# Patient Record
Sex: FEMALE | Race: WHITE | HISPANIC OR LATINO | URBAN - METROPOLITAN AREA
[De-identification: names, ages, dates, MRNs, and addresses within clinical notes are randomized per-mention and may not be internally consistent; named-entity substitution may affect disease eponyms.]

---

## 2020-06-22 ENCOUNTER — OFFICE VISIT (OUTPATIENT)
Dept: URBAN - METROPOLITAN AREA CLINIC 78 | Facility: CLINIC | Age: 60
End: 2020-06-22

## 2020-07-13 ENCOUNTER — OFFICE VISIT (OUTPATIENT)
Dept: URBAN - METROPOLITAN AREA CLINIC 78 | Facility: CLINIC | Age: 60
End: 2020-07-13
Payer: MEDICARE

## 2020-07-13 DIAGNOSIS — Z86.19 H/O HELICOBACTER INFECTION: ICD-10-CM

## 2020-07-13 DIAGNOSIS — K29.70 GASTRITIS: ICD-10-CM

## 2020-07-13 DIAGNOSIS — K58.9 IBS (IRRITABLE BOWEL SYNDROME)-DIARRHEA: ICD-10-CM

## 2020-07-13 DIAGNOSIS — G47.00 INSOMNIA: ICD-10-CM

## 2020-07-13 DIAGNOSIS — R10.13 DYSPEPSIA: ICD-10-CM

## 2020-07-13 DIAGNOSIS — R10.9 ABDOMINAL CRAMPS: ICD-10-CM

## 2020-07-13 DIAGNOSIS — K63.5 COLON POLYPS: ICD-10-CM

## 2020-07-13 PROCEDURE — G9903 PT SCRN TBCO ID AS NON USER: HCPCS | Performed by: INTERNAL MEDICINE

## 2020-07-13 PROCEDURE — 3017F COLORECTAL CA SCREEN DOC REV: CPT | Performed by: INTERNAL MEDICINE

## 2020-07-13 PROCEDURE — 99214 OFFICE O/P EST MOD 30 MIN: CPT | Performed by: INTERNAL MEDICINE

## 2020-07-13 PROCEDURE — G8427 DOCREV CUR MEDS BY ELIG CLIN: HCPCS | Performed by: INTERNAL MEDICINE

## 2020-07-13 PROCEDURE — G8417 CALC BMI ABV UP PARAM F/U: HCPCS | Performed by: INTERNAL MEDICINE

## 2020-07-13 RX ORDER — ANASTROZOLE 1 MG/1
TAKE 1 TABLET (1 MG) BY ORAL ROUTE ONCE DAILY TABLET ORAL 1
Qty: 0 | Refills: 0 | Status: ACTIVE | COMMUNITY
Start: 1900-01-01

## 2020-07-13 RX ORDER — AMITRIPTYLINE HYDROCHLORIDE 10 MG/1
1 TABLET AT BEDTIME TABLET, FILM COATED ORAL ONCE A DAY
Qty: 30 TABLET | Refills: 1 | OUTPATIENT
Start: 2020-07-13

## 2020-07-13 RX ORDER — TRAMADOL HYDROCHLORIDE 50 MG/1
TAKE 1 TABLET (50 MG) BY ORAL ROUTE EVERY 6 HOURS AS NEEDED ONLY IN CASE SEVERE ABDOMINAL PAIN TABLET, COATED ORAL
Qty: 20 | Refills: 0 | Status: ACTIVE | COMMUNITY
Start: 2020-05-11

## 2020-07-13 RX ORDER — OMEPRAZOLE 40 MG/1
TAKE 1 CAPSULE (40 MG) BY ORAL ROUTE ONCE DAILY BEFORE A MEAL FOR 30 DAYS CAPSULE, DELAYED RELEASE PELLETS ORAL 1
Qty: 30 | Refills: 11 | Status: ACTIVE | COMMUNITY
Start: 2019-08-01 | End: 2020-07-26

## 2020-07-13 RX ORDER — OMEPRAZOLE 20 MG/1
1 CAPSULE 30 MINUTES BEFORE MORNING MEAL CAPSULE, DELAYED RELEASE ORAL ONCE A DAY
Qty: 30 | OUTPATIENT
Start: 2020-07-13

## 2020-07-13 RX ORDER — HYOSCYAMINE SULFATE 0.12 MG/1
TAKE 1 TABLET (0.125 MG) BY ORAL ROUTE EVERY 6 HOURS AS NEEDED FOR ABDOMINAL CRAMPS TABLET ORAL
Qty: 30 | Refills: 2 | Status: ACTIVE | COMMUNITY
Start: 2020-05-11

## 2020-07-13 NOTE — HPI-OTHER HISTORIES
The patient was previously seen by Dr. Frederick Padron.  She relates that she has been experiencing abdominal pain  since the past 1 year. She describes the pain as severe, localized to the lower abdomen associated with diaphoresis and a "feeling that she is going to pass out." The pain is crampy in nature, twisting, radiating to both the LLQ and RLQ, 10/10 in intensity. These episodes tend to occur in the morning hours and they are usually associated with a bout of loose stools. By noon it gets better. She has not had nausea or vomiting. She has seen mucus and blood in the stools. She has tried TUMS & Prilosec OTC prn with fair results. She has not had heartburn or dysphagia. She is cutting back on coffee, chocolate and other citrics. She denies NSAIDs use.   She has lost a good amount of weight but is starting to regain some of it back.    She ended up seeing Dr. Ty Bustos in January 2020. He performed an EGD which showed gastritis and a hiatal hernia, as per the patient. She was told she had H pylori infection. She was treated for such. After completing therapy, she was instructed to get an H pylori breath test to cofirm eradication which was apparently negative. Despite this Dr. Bustos sent her home with a second round of antibiotics. She also had a RUQ US which was apparently normal. I do not have any of these records to review at this time. She has been feeling better since her last visit. She has been using the antispasmodics. She has beennusing Prilosec OTC. She has also used TUMS but did not   she had a CT A/P ~Spring 2020- she was told that there was no evidence of metastatic disease (she has a remote history of breast cancer diagnosed in November 2016. She was treated with chemo, XRT and surgery).   Last colonoscopy was done in 2018, normal as per the patient. A repeat colonoscopy was advised in 10 years. She tells me that her sister has had "colitis." There is no FH of GI malignancies.    She is originally from [facility].  Today we reviewed in detail the results of her recent colonoscopy and path as well as GI PCR panel.    Summary of prior workup: - Colonoscopy by me on 5/27/20 revealed a normal TI, a cecal 5mm lymph aggregate, asc TA, sigm tix, mild sigm erythema (biopsies benign, negative for colitis) and IH's. The quality of the prep was good. A repeat colonoscopy was advised in 5 years. - GI PCR panel on 5/15/20 was negative.

## 2020-08-20 ENCOUNTER — OFFICE VISIT (OUTPATIENT)
Dept: URBAN - METROPOLITAN AREA CLINIC 78 | Facility: CLINIC | Age: 60
End: 2020-08-20

## 2020-08-20 RX ORDER — ANASTROZOLE 1 MG/1
TAKE 1 TABLET (1 MG) BY ORAL ROUTE ONCE DAILY TABLET ORAL 1
Qty: 0 | Refills: 0 | Status: ACTIVE | COMMUNITY
Start: 1900-01-01

## 2020-08-20 RX ORDER — HYOSCYAMINE SULFATE 0.12 MG/1
TAKE 1 TABLET (0.125 MG) BY ORAL ROUTE EVERY 6 HOURS AS NEEDED FOR ABDOMINAL CRAMPS TABLET ORAL
Qty: 30 | Refills: 2 | Status: ACTIVE | COMMUNITY
Start: 2020-05-11

## 2020-08-20 RX ORDER — AMITRIPTYLINE HYDROCHLORIDE 10 MG/1
1 TABLET AT BEDTIME TABLET, FILM COATED ORAL ONCE A DAY
Qty: 30 TABLET | Refills: 1 | OUTPATIENT

## 2020-08-20 RX ORDER — AMITRIPTYLINE HYDROCHLORIDE 10 MG/1
1 TABLET AT BEDTIME TABLET, FILM COATED ORAL ONCE A DAY
Qty: 30 TABLET | Refills: 1 | Status: ACTIVE | COMMUNITY
Start: 2020-07-13

## 2020-08-20 RX ORDER — TRAMADOL HYDROCHLORIDE 50 MG/1
TAKE 1 TABLET (50 MG) BY ORAL ROUTE EVERY 6 HOURS AS NEEDED ONLY IN CASE SEVERE ABDOMINAL PAIN TABLET, COATED ORAL
Qty: 20 | Refills: 0 | Status: ACTIVE | COMMUNITY
Start: 2020-05-11

## 2020-08-20 RX ORDER — OMEPRAZOLE 20 MG/1
1 CAPSULE 30 MINUTES BEFORE MORNING MEAL CAPSULE, DELAYED RELEASE ORAL ONCE A DAY
Qty: 30 | OUTPATIENT

## 2020-08-20 RX ORDER — OMEPRAZOLE 20 MG/1
1 CAPSULE 30 MINUTES BEFORE MORNING MEAL CAPSULE, DELAYED RELEASE ORAL ONCE A DAY
Qty: 30 | Status: ACTIVE | COMMUNITY
Start: 2020-07-13

## 2020-08-20 NOTE — HPI-OTHER HISTORIES
The patient was previously seen by Dr. Frederick Padorn.  She relates that she has been experiencing abdominal pain  since the past 1 year. She describes the pain as severe, localized to the lower abdomen associated with diaphoresis and a "feeling that she is going to pass out." The pain is crampy in nature, twisting, radiating to both the LLQ and RLQ, 10/10 in intensity. These episodes tend to occur in the morning hours and they are usually associated with a bout of loose stools. By noon it gets better. She has not had nausea or vomiting. She has seen mucus and blood in the stools. She has tried TUMS & Prilosec OTC prn with fair results. She has not had heartburn or dysphagia. She is cutting back on coffee, chocolate and other citrics. She denies NSAIDs use.   She has lost a good amount of weight but is starting to regain some of it back.    She ended up seeing Dr. Ty Bustos in January 2020. He performed an EGD which showed gastritis and a hiatal hernia, as per the patient. She was told she had H pylori infection. She was treated for such. After completing therapy, she was instructed to get an H pylori breath test to cofirm eradication which was apparently negative. Despite this Dr. Bustos sent her home with a second round of antibiotics. She also had a RUQ US which was apparently normal. I do not have any of these records to review at this time. She has been feeling better since her last visit. She has been using the antispasmodics. She has beennusing Prilosec OTC. She has also used TUMS but did not   she had a CT A/P ~Spring 2020- she was told that there was no evidence of metastatic disease (she has a remote history of breast cancer diagnosed in November 2016. She was treated with chemo, XRT and surgery).   Last colonoscopy was done in 2018, normal as per the patient. A repeat colonoscopy was advised in 10 years. She tells me that her sister has had "colitis." There is no FH of GI malignancies.    She is originally from [facility].  Today we reviewed in detail the results of her recent colonoscopy and path as well as GI PCR panel.    Summary of prior workup: - Colonoscopy by me on 5/27/20 revealed a normal TI, a cecal 5mm lymph aggregate, asc TA, sigm tix, mild sigm erythema (biopsies benign, negative for colitis) and IH's. The quality of the prep was good. A repeat colonoscopy was advised in 5 years. - GI PCR panel on 5/15/20 was negative.

## 2020-09-01 ENCOUNTER — OFFICE VISIT (OUTPATIENT)
Dept: URBAN - METROPOLITAN AREA TELEHEALTH 2 | Facility: TELEHEALTH | Age: 60
End: 2020-09-01

## 2020-09-04 ENCOUNTER — OFFICE VISIT (OUTPATIENT)
Dept: URBAN - METROPOLITAN AREA TELEHEALTH 2 | Facility: TELEHEALTH | Age: 60
End: 2020-09-04
Payer: MEDICARE

## 2020-09-04 DIAGNOSIS — K63.5 COLON POLYPS: ICD-10-CM

## 2020-09-04 DIAGNOSIS — K58.8 OTHER IRRITABLE BOWEL SYNDROME: ICD-10-CM

## 2020-09-04 DIAGNOSIS — G47.00 INSOMNIA: ICD-10-CM

## 2020-09-04 DIAGNOSIS — R10.84 ABDOMINAL PAIN, GENERALIZED: ICD-10-CM

## 2020-09-04 DIAGNOSIS — Z86.19 H/O HELICOBACTER INFECTION: ICD-10-CM

## 2020-09-04 DIAGNOSIS — R10.13 DYSPEPSIA: ICD-10-CM

## 2020-09-04 DIAGNOSIS — K29.70 GASTRITIS: ICD-10-CM

## 2020-09-04 PROCEDURE — G8420 CALC BMI NORM PARAMETERS: HCPCS | Performed by: INTERNAL MEDICINE

## 2020-09-04 PROCEDURE — 99214 OFFICE O/P EST MOD 30 MIN: CPT | Performed by: INTERNAL MEDICINE

## 2020-09-04 PROCEDURE — G9903 PT SCRN TBCO ID AS NON USER: HCPCS | Performed by: INTERNAL MEDICINE

## 2020-09-04 PROCEDURE — 3017F COLORECTAL CA SCREEN DOC REV: CPT | Performed by: INTERNAL MEDICINE

## 2020-09-04 PROCEDURE — G8427 DOCREV CUR MEDS BY ELIG CLIN: HCPCS | Performed by: INTERNAL MEDICINE

## 2020-09-04 PROCEDURE — 1036F TOBACCO NON-USER: CPT | Performed by: INTERNAL MEDICINE

## 2020-09-04 RX ORDER — OMEPRAZOLE 20 MG/1
1 CAPSULE 30 MINUTES BEFORE MORNING MEAL CAPSULE, DELAYED RELEASE ORAL ONCE A DAY
Qty: 30 | Status: ACTIVE | COMMUNITY

## 2020-09-04 RX ORDER — HYOSCYAMINE SULFATE 0.12 MG/1
TAKE 1 TABLET (0.125 MG) BY ORAL ROUTE EVERY 6 HOURS AS NEEDED FOR ABDOMINAL CRAMPS TABLET ORAL
Qty: 30 | Refills: 2 | Status: ACTIVE | COMMUNITY
Start: 2020-05-11

## 2020-09-04 RX ORDER — ANASTROZOLE 1 MG/1
TAKE 1 TABLET (1 MG) BY ORAL ROUTE ONCE DAILY TABLET ORAL 1
Qty: 0 | Refills: 0 | Status: ACTIVE | COMMUNITY
Start: 1900-01-01

## 2020-09-04 RX ORDER — AMITRIPTYLINE HYDROCHLORIDE 10 MG/1
1 TABLET AT BEDTIME TABLET, FILM COATED ORAL ONCE A DAY
Qty: 30 TABLET | Refills: 1 | Status: ACTIVE | COMMUNITY

## 2020-09-04 RX ORDER — HYOSCYAMINE SULFATE 0.12 MG/1
1 TABLET AS NEEDED TABLET ORAL
Qty: 50 | Refills: 2 | OUTPATIENT
Start: 2020-09-04 | End: 2021-06-01

## 2020-09-04 RX ORDER — AMITRIPTYLINE HYDROCHLORIDE 25 MG/1
1 TABLET AT BEDTIME TABLET, FILM COATED ORAL ONCE A DAY
Qty: 30 TABLET | Refills: 2 | OUTPATIENT
Start: 2020-09-04

## 2020-09-04 RX ORDER — TRAMADOL HYDROCHLORIDE 50 MG/1
TAKE 1 TABLET (50 MG) BY ORAL ROUTE EVERY 6 HOURS AS NEEDED ONLY IN CASE SEVERE ABDOMINAL PAIN TABLET, COATED ORAL
Qty: 20 | Refills: 0 | Status: ACTIVE | COMMUNITY
Start: 2020-05-11

## 2020-09-04 NOTE — HPI-OTHER HISTORIES
The patient has been experiencing abdominal pain  since the past 1 year. She describes the pain as severe, localized to the lower abdomen associated with diaphoresis and a "feeling that she is going to pass out." The pain is crampy in nature, twisting, radiating to both the LLQ and RLQ, 10/10 in intensity. These episodes tend to occur in the morning hours and they are usually associated with a bout of loose stools. By noon it gets better. She has not had nausea or vomiting. She has seen mucus and blood in the stools. She has tried TUMS & Prilosec OTC prn with fair results. She has not had heartburn or dysphagia. She is cutting back on coffee, chocolate and other citrics. She denies NSAIDs use.   She has lost a good amount of weight but is starting to regain some of it back.    She saw Dr. Ty Bustos in January 2020. He performed an EGD which showed gastritis and a hiatal hernia, as per the patient. She was told she had H pylori infection. She was treated for such. After completing therapy, she was instructed to get an H pylori breath test to cofirm eradication which was apparently negative. Despite this Dr. Bustos sent her home with a second round of antibiotics. She also had a RUQ US which was apparently normal. I do not have any of these records to review at this time. She has been feeling better since her last visit. She has been using the antispasmodics. She has been using Prilosec OTC. She has also used TUMS but did not need these as frequently as before.  She has been doing quite well for the past few weeks, except for the past 3 days,(since running out of the Amitryptiline) when she started having frequent small BM's, formed, with tiny amount of blood. She has had mild cramping, but when she does take the Levsin it helps diminish these. No fevers or chills. There have been no sick contacts.   She had a CT A/P ~Spring 2020- she was told that there was no evidence of metastatic disease (she has a remote history of breast cancer diagnosed in November 2016. She was treated with chemo, XRT and surgery).   Last colonoscopy was done in 2018, normal as per the patient. A repeat colonoscopy was advised in 10 years. She tells me that her sister has had "colitis." There is no FH of GI malignancies.    She is originally from [facility].  She is very worried about her son making the trek back to the [place].    Summary of prior workup: - Colonoscopy by me on 5/27/20 revealed a normal TI, a cecal 5mm lymph aggregate, asc TA, sigm tix, mild sigm erythema (biopsies benign, negative for colitis) and IH's. The quality of the prep was good. A repeat colonoscopy was advised in 5 years. - GI PCR panel on 5/15/20 was negative.

## 2020-09-10 ENCOUNTER — OFFICE VISIT (OUTPATIENT)
Dept: URBAN - METROPOLITAN AREA CLINIC 78 | Facility: CLINIC | Age: 60
End: 2020-09-10

## 2021-06-04 ENCOUNTER — TELEPHONE ENCOUNTER (OUTPATIENT)
Dept: URBAN - METROPOLITAN AREA CLINIC 78 | Facility: CLINIC | Age: 61
End: 2021-06-04

## 2021-06-04 RX ORDER — HYOSCYAMINE SULFATE 0.12 MG/1
1 TABLET UNDER THE TONGUE AND ALLOW TO DISSOLVE TABLET, ORALLY DISINTEGRATING ORAL
Qty: 40 | Refills: 2 | OUTPATIENT
Start: 2021-06-04 | End: 2022-02-28

## 2021-06-15 ENCOUNTER — OFFICE VISIT (OUTPATIENT)
Dept: URBAN - METROPOLITAN AREA CLINIC 78 | Facility: CLINIC | Age: 61
End: 2021-06-15
Payer: MEDICARE

## 2021-06-15 VITALS
RESPIRATION RATE: 16 BRPM | TEMPERATURE: 97.8 F | DIASTOLIC BLOOD PRESSURE: 69 MMHG | WEIGHT: 162.6 LBS | HEIGHT: 63 IN | SYSTOLIC BLOOD PRESSURE: 120 MMHG | HEART RATE: 80 BPM | BODY MASS INDEX: 28.81 KG/M2

## 2021-06-15 DIAGNOSIS — R10.9 ABDOMINAL CRAMPS: ICD-10-CM

## 2021-06-15 DIAGNOSIS — G47.00 INSOMNIA: ICD-10-CM

## 2021-06-15 DIAGNOSIS — K29.70 GASTRITIS: ICD-10-CM

## 2021-06-15 DIAGNOSIS — K63.5 COLON POLYPS: ICD-10-CM

## 2021-06-15 DIAGNOSIS — R10.13 DYSPEPSIA: ICD-10-CM

## 2021-06-15 DIAGNOSIS — K58.9 IBS (IRRITABLE BOWEL SYNDROME)-DIARRHEA: ICD-10-CM

## 2021-06-15 DIAGNOSIS — Z86.19 H/O HELICOBACTER INFECTION: ICD-10-CM

## 2021-06-15 PROCEDURE — 99214 OFFICE O/P EST MOD 30 MIN: CPT | Performed by: INTERNAL MEDICINE

## 2021-06-15 RX ORDER — OMEPRAZOLE 20 MG/1
1 CAPSULE 30 MINUTES BEFORE MORNING MEAL CAPSULE, DELAYED RELEASE ORAL ONCE A DAY
Qty: 30 | Status: ACTIVE | COMMUNITY

## 2021-06-15 RX ORDER — HYOSCYAMINE SULFATE 0.12 MG/1
1 TABLET UNDER THE TONGUE AND ALLOW TO DISSOLVE TABLET, ORALLY DISINTEGRATING ORAL
Qty: 40 | Refills: 2 | Status: ACTIVE | COMMUNITY
Start: 2021-06-04 | End: 2022-02-28

## 2021-06-15 RX ORDER — ANASTROZOLE 1 MG/1
TAKE 1 TABLET (1 MG) BY ORAL ROUTE ONCE DAILY TABLET ORAL 1
Qty: 0 | Refills: 0 | Status: ACTIVE | COMMUNITY
Start: 1900-01-01

## 2021-06-15 RX ORDER — AMITRIPTYLINE HYDROCHLORIDE 25 MG/1
1 TABLET AT BEDTIME TABLET, FILM COATED ORAL ONCE A DAY
Qty: 30 TABLET | Refills: 2 | Status: ON HOLD | COMMUNITY
Start: 2020-09-04

## 2021-06-15 RX ORDER — TRAMADOL HYDROCHLORIDE 50 MG/1
TAKE 1 TABLET (50 MG) BY ORAL ROUTE EVERY 6 HOURS AS NEEDED ONLY IN CASE SEVERE ABDOMINAL PAIN TABLET, COATED ORAL
Qty: 20 | Refills: 0 | Status: ACTIVE | COMMUNITY
Start: 2020-05-11

## 2021-06-15 RX ORDER — AMITRIPTYLINE HYDROCHLORIDE 10 MG/1
1 TABLET AT BEDTIME TABLET, FILM COATED ORAL ONCE A DAY
Qty: 30 TABLET | Refills: 1 | Status: ON HOLD | COMMUNITY

## 2021-06-15 RX ORDER — AMITRIPTYLINE HYDROCHLORIDE 25 MG/1
1 TABLET AT BEDTIME TABLET, FILM COATED ORAL ONCE A DAY
Qty: 30 TABLET | Refills: 2 | OUTPATIENT

## 2021-06-15 RX ORDER — HYOSCYAMINE SULFATE 0.12 MG/1
TAKE 1 TABLET (0.125 MG) BY ORAL ROUTE EVERY 6 HOURS AS NEEDED FOR ABDOMINAL CRAMPS TABLET ORAL
Qty: 30 | Refills: 2 | Status: ON HOLD | COMMUNITY
Start: 2020-05-11

## 2021-06-15 NOTE — HPI-OTHER HISTORIES
The patient has been experiencing chronic abdominal pain, described as severe, localized to the lower abdomen associated with diaphoresis and a "feeling that she is going to pass out." The pain is crampy in nature, twisting, radiating to both the LLQ and RLQ, 10/10 in intensity. These episodes tend to occur in the morning hours and they are usually associated with a bout of loose stools. By noon it gets better. She has not had nausea or vomiting. She has seen mucus and blood in the stools. She has tried TUMS & Prilosec OTC prn with fair results. She has not had heartburn or dysphagia. She is cutting back on coffee, chocolate and other citrics. She denies NSAIDs use.   She saw Dr. Ty Bustos in January 2020. He performed an EGD which showed gastritis and a hiatal hernia, as per the patient. She was told she had H pylori infection. She was treated for such. After completing therapy, she was instructed to get an H pylori breath test to cofirm eradication which was apparently negative. Despite this Dr. Bustos sent her home with a second round of antibiotics. She also had a RUQ US which was apparently normal. Eradication has never been confirmed to her knowledge. She has been using the antispasmodics with some relief. She has been using Prilosec OTC. She has also used TUMS but did not need these as frequently as before.  She had been doing quite well while on Amitriptyline, and as a matter of fact reported worsening of her symtpoms after running out of the med last time she was here. She has continued seeing tiny amount of blood in the stools on and off.   Although she was given a new prescription for Amnitriptyline at her last visit, she used it for a month or 2 and then stopped it when she ran out.   She had a CT A/P ~Spring 2020- she was told that there was no evidence of metastatic disease (she has a remote history of breast cancer diagnosed in November 2016. She was treated with chemo, XRT and surgery).   She is originally from [facility].  Her son was able to make the trek back to the [place] but with many hindrances.    She tells me that her sister has had "colitis." There is no FH of GI malignancies.     Summary of prior workup: - Colonoscopy by me on 5/27/20 revealed a normal TI, a cecal 5mm lymph aggregate, asc TA, sigm tix, mild sigm erythema (biopsies benign, negative for colitis) and IH's. The quality of the prep was good. A repeat colonoscopy was advised in 5 years. - GI PCR panel on 5/15/20 was negative.  -  Colonoscopy in 2018, normal as per the patient.

## 2021-06-18 ENCOUNTER — TELEPHONE ENCOUNTER (OUTPATIENT)
Dept: URBAN - METROPOLITAN AREA CLINIC 78 | Facility: CLINIC | Age: 61
End: 2021-06-18

## 2021-06-18 ENCOUNTER — OFFICE VISIT (OUTPATIENT)
Dept: URBAN - METROPOLITAN AREA SURGERY CENTER 13 | Facility: SURGERY CENTER | Age: 61
End: 2021-06-18
Payer: MEDICARE

## 2021-06-18 ENCOUNTER — CLAIMS CREATED FROM THE CLAIM WINDOW (OUTPATIENT)
Dept: URBAN - METROPOLITAN AREA CLINIC 4 | Facility: CLINIC | Age: 61
End: 2021-06-18
Payer: MEDICARE

## 2021-06-18 DIAGNOSIS — K29.60 ADENOPAPILLOMATOSIS GASTRICA: ICD-10-CM

## 2021-06-18 DIAGNOSIS — K21.9 ACID REFLUX: ICD-10-CM

## 2021-06-18 DIAGNOSIS — K21.9 GASTRO-ESOPHAGEAL REFLUX DISEASE WITHOUT ESOPHAGITIS: ICD-10-CM

## 2021-06-18 DIAGNOSIS — R10.84 ABDOMINAL CRAMPING, GENERALIZED: ICD-10-CM

## 2021-06-18 DIAGNOSIS — B96.81 BACTERIAL INFECTION DUE TO H. PYLORI: ICD-10-CM

## 2021-06-18 DIAGNOSIS — K31.89 MESENTEROAXIAL GASTRIC VOLVULUS: ICD-10-CM

## 2021-06-18 DIAGNOSIS — B96.81 HELICOBACTER PYLORI [H. PYLORI] AS THE CAUSE OF DISEASES CLASSIFIED ELSEWHERE: ICD-10-CM

## 2021-06-18 DIAGNOSIS — K29.60 OTHER GASTRITIS WITHOUT BLEEDING: ICD-10-CM

## 2021-06-18 PROCEDURE — 88312 SPECIAL STAINS GROUP 1: CPT | Performed by: PATHOLOGY

## 2021-06-18 PROCEDURE — G8907 PT DOC NO EVENTS ON DISCHARG: HCPCS | Performed by: INTERNAL MEDICINE

## 2021-06-18 PROCEDURE — 88342 IMHCHEM/IMCYTCHM 1ST ANTB: CPT | Performed by: PATHOLOGY

## 2021-06-18 PROCEDURE — 43239 EGD BIOPSY SINGLE/MULTIPLE: CPT | Performed by: INTERNAL MEDICINE

## 2021-06-18 PROCEDURE — 88305 TISSUE EXAM BY PATHOLOGIST: CPT | Performed by: PATHOLOGY

## 2021-06-18 RX ORDER — AMITRIPTYLINE HYDROCHLORIDE 25 MG/1
1 TABLET AT BEDTIME TABLET, FILM COATED ORAL ONCE A DAY
Qty: 30 TABLET | Refills: 2 | Status: ACTIVE | COMMUNITY

## 2021-06-18 RX ORDER — TRAMADOL HYDROCHLORIDE 50 MG/1
TAKE 1 TABLET (50 MG) BY ORAL ROUTE EVERY 6 HOURS AS NEEDED ONLY IN CASE SEVERE ABDOMINAL PAIN TABLET, COATED ORAL
Qty: 20 | Refills: 0 | Status: ACTIVE | COMMUNITY
Start: 2020-05-11

## 2021-06-18 RX ORDER — HYOSCYAMINE SULFATE 0.12 MG/1
TAKE 1 TABLET (0.125 MG) BY ORAL ROUTE EVERY 6 HOURS AS NEEDED FOR ABDOMINAL CRAMPS TABLET ORAL
Qty: 30 | Refills: 2 | Status: ON HOLD | COMMUNITY
Start: 2020-05-11

## 2021-06-18 RX ORDER — AMITRIPTYLINE HYDROCHLORIDE 10 MG/1
1 TABLET AT BEDTIME TABLET, FILM COATED ORAL ONCE A DAY
Qty: 30 TABLET | Refills: 1 | Status: ON HOLD | COMMUNITY

## 2021-06-18 RX ORDER — HYOSCYAMINE SULFATE 0.12 MG/1
1 TABLET UNDER THE TONGUE AND ALLOW TO DISSOLVE TABLET, ORALLY DISINTEGRATING ORAL
Qty: 40 | Refills: 2 | Status: ACTIVE | COMMUNITY
Start: 2021-06-04 | End: 2022-02-28

## 2021-06-18 RX ORDER — HYDROCORTISONE ACETATE 25 MG/1
1 SUPPOSITORY SUPPOSITORY RECTAL
Qty: 14 | Refills: 0 | OUTPATIENT
Start: 2021-06-18 | End: 2021-06-25

## 2021-06-18 RX ORDER — OMEPRAZOLE 20 MG/1
1 CAPSULE 30 MINUTES BEFORE MORNING MEAL CAPSULE, DELAYED RELEASE ORAL ONCE A DAY
Qty: 30 | Status: ACTIVE | COMMUNITY

## 2021-06-18 RX ORDER — ANASTROZOLE 1 MG/1
TAKE 1 TABLET (1 MG) BY ORAL ROUTE ONCE DAILY TABLET ORAL 1
Qty: 0 | Refills: 0 | Status: ACTIVE | COMMUNITY
Start: 1900-01-01

## 2021-06-23 ENCOUNTER — TELEPHONE ENCOUNTER (OUTPATIENT)
Dept: URBAN - METROPOLITAN AREA CLINIC 78 | Facility: CLINIC | Age: 61
End: 2021-06-23

## 2021-06-23 RX ORDER — OMEPRAZOLE 20 MG/1
1 CAPSULE 30 MINUTES BEFORE MORNING MEAL AND 30 MINUTES BEFORE DINNER CAPSULE, DELAYED RELEASE ORAL BID
Qty: 28 | Refills: 0 | OUTPATIENT
Start: 2021-06-28

## 2021-06-23 RX ORDER — AMOXICILLIN 500 MG/1
2 CAPSULES CAPSULE ORAL
Qty: 56 CAPSULE | Refills: 0 | OUTPATIENT
Start: 2021-06-28 | End: 2021-07-12

## 2021-06-23 RX ORDER — LEVOFLOXACIN 250 MG/1
2 TABLETS TABLET, FILM COATED ORAL ONCE A DAY
Qty: 28 TABLET | Refills: 0 | OUTPATIENT
Start: 2021-06-28 | End: 2021-07-12

## 2021-08-17 ENCOUNTER — OFFICE VISIT (OUTPATIENT)
Dept: URBAN - METROPOLITAN AREA CLINIC 78 | Facility: CLINIC | Age: 61
End: 2021-08-17

## 2021-10-21 ENCOUNTER — OFFICE VISIT (OUTPATIENT)
Dept: URBAN - METROPOLITAN AREA CLINIC 78 | Facility: CLINIC | Age: 61
End: 2021-10-21

## 2021-10-28 ENCOUNTER — TELEPHONE ENCOUNTER (OUTPATIENT)
Dept: URBAN - METROPOLITAN AREA SURGERY CENTER 30 | Facility: SURGERY CENTER | Age: 61
End: 2021-10-28

## 2021-10-28 RX ORDER — HYOSCYAMINE SULFATE 0.12 MG/1
TAKE 1 TABLET (0.125 MG) BY ORAL ROUTE EVERY 6 HOURS AS NEEDED FOR ABDOMINAL CRAMPS TABLET ORAL
Qty: 90 | Refills: 2
Start: 2020-05-11 | End: 2022-01-25

## 2021-10-28 RX ORDER — AMITRIPTYLINE HYDROCHLORIDE 25 MG/1
1 TABLET AT BEDTIME TABLET, FILM COATED ORAL ONCE A DAY
Qty: 30 TABLET | Refills: 4

## 2021-12-17 ENCOUNTER — OFFICE VISIT (OUTPATIENT)
Dept: URBAN - METROPOLITAN AREA CLINIC 78 | Facility: CLINIC | Age: 61
End: 2021-12-17

## 2022-02-07 ENCOUNTER — DASHBOARD ENCOUNTERS (OUTPATIENT)
Age: 62
End: 2022-02-07

## 2022-02-07 ENCOUNTER — OFFICE VISIT (OUTPATIENT)
Dept: URBAN - METROPOLITAN AREA CLINIC 78 | Facility: CLINIC | Age: 62
End: 2022-02-07
Payer: MEDICARE

## 2022-02-07 VITALS
WEIGHT: 156.6 LBS | HEIGHT: 63 IN | BODY MASS INDEX: 27.75 KG/M2 | SYSTOLIC BLOOD PRESSURE: 146 MMHG | DIASTOLIC BLOOD PRESSURE: 92 MMHG | HEART RATE: 87 BPM | TEMPERATURE: 97.3 F

## 2022-02-07 DIAGNOSIS — K58.9 IBS (IRRITABLE BOWEL SYNDROME)-DIARRHEA: ICD-10-CM

## 2022-02-07 DIAGNOSIS — K29.70 GASTRITIS: ICD-10-CM

## 2022-02-07 DIAGNOSIS — Z86.19 H/O HELICOBACTER INFECTION: ICD-10-CM

## 2022-02-07 DIAGNOSIS — K63.5 COLON POLYPS: ICD-10-CM

## 2022-02-07 DIAGNOSIS — R10.9 ABDOMINAL CRAMPS: ICD-10-CM

## 2022-02-07 DIAGNOSIS — G47.00 INSOMNIA: ICD-10-CM

## 2022-02-07 DIAGNOSIS — R12 HEARTBURN: ICD-10-CM

## 2022-02-07 DIAGNOSIS — R10.13 DYSPEPSIA: ICD-10-CM

## 2022-02-07 PROBLEM — 10743008 IRRITABLE BOWEL SYNDROME: Status: ACTIVE | Noted: 2021-06-15

## 2022-02-07 PROBLEM — 193462001 INSOMNIA: Status: ACTIVE | Noted: 2021-06-15

## 2022-02-07 PROBLEM — 4556007 GASTRITIS: Status: ACTIVE | Noted: 2021-06-15

## 2022-02-07 PROCEDURE — 99214 OFFICE O/P EST MOD 30 MIN: CPT | Performed by: INTERNAL MEDICINE

## 2022-02-07 RX ORDER — LORATADINE 10 MG/1
1 TABLET TABLET ORAL ONCE A DAY
Status: ACTIVE | COMMUNITY

## 2022-02-07 RX ORDER — HYOSCYAMINE SULFATE 0.12 MG/1
1 TABLET UNDER THE TONGUE AND ALLOW TO DISSOLVE TABLET, ORALLY DISINTEGRATING ORAL
Qty: 40 | Refills: 2 | Status: ON HOLD | COMMUNITY
Start: 2021-06-04 | End: 2022-02-28

## 2022-02-07 RX ORDER — OMEPRAZOLE 20 MG/1
1 CAPSULE 30 MINUTES BEFORE MORNING MEAL CAPSULE, DELAYED RELEASE ORAL ONCE A DAY
Qty: 30 | Status: ON HOLD | COMMUNITY

## 2022-02-07 RX ORDER — AMITRIPTYLINE HYDROCHLORIDE 25 MG/1
1 TABLET AT BEDTIME TABLET, FILM COATED ORAL ONCE A DAY
Qty: 30 TABLET | Refills: 4 | Status: ON HOLD | COMMUNITY

## 2022-02-07 RX ORDER — OMEPRAZOLE 20 MG/1
1 CAPSULE 30 MINUTES BEFORE MORNING MEAL AND 30 MINUTES BEFORE DINNER CAPSULE, DELAYED RELEASE ORAL BID
Qty: 28 | Refills: 0 | Status: ON HOLD | COMMUNITY
Start: 2021-06-28

## 2022-02-07 RX ORDER — TRAMADOL HYDROCHLORIDE 50 MG/1
TAKE 1 TABLET (50 MG) BY ORAL ROUTE EVERY 6 HOURS AS NEEDED ONLY IN CASE SEVERE ABDOMINAL PAIN TABLET, COATED ORAL
Qty: 20 | Refills: 0 | Status: ON HOLD | COMMUNITY
Start: 2020-05-11

## 2022-02-07 RX ORDER — AMITRIPTYLINE HYDROCHLORIDE 25 MG/1
1 TABLET AT BEDTIME TABLET, FILM COATED ORAL ONCE A DAY
Qty: 90 | Refills: 3

## 2022-02-07 RX ORDER — ANASTROZOLE 1 MG/1
TAKE 1 TABLET (1 MG) BY ORAL ROUTE ONCE DAILY TABLET ORAL 1
Qty: 0 | Refills: 0 | Status: ACTIVE | COMMUNITY
Start: 1900-01-01

## 2022-02-07 RX ORDER — AMITRIPTYLINE HYDROCHLORIDE 10 MG/1
1 TABLET AT BEDTIME TABLET, FILM COATED ORAL ONCE A DAY
Qty: 30 TABLET | Refills: 1 | Status: ON HOLD | COMMUNITY

## 2022-02-07 NOTE — HPI-OTHER HISTORIES
I have followed the patient for chronic abdominal pain, described as severe, localized to the lower abdomen associated with diaphoresis.   She saw Dr. Ty Bustos in January 2020. He performed an EGD which showed gastritis and a hiatal hernia, as per the patient. She was told she had H pylori infection. She was treated for such. After completing therapy, she was instructed to get an H pylori breath test to cofirm eradication which was apparently negative. Despite this Dr. Bustos sent her home with a second round of antibiotics. She also had a RUQ US which was apparently normal.   She has been doing quite well while on Amitriptyline, and as a matter of fact reported worsening of her symtpoms after running out of the med last time she was here.  She ran out of the Amitriptyline. She feels she is doing great on this and states she needs a new prescription. She has not noticed nearly as many episodes of pain. When she does, she has been using the Levsin prn with great results.   Apetite has been good. She has been avoiding mutliple foods including pork and tamales.   She had a CT A/P ~Spring 2020- she was told that there was no evidence of metastatic disease (she has a remote history of breast cancer diagnosed in November 2016. She was treated with chemo, XRT and surgery).   She is originally from [facility].  Her son was able to make the trek back to the [place] but with many hindrances.    She tells me that her sister has had "colitis." There is no FH of GI malignancies.     Summary of prior workup: - Colonoscopy by me on 5/27/20 revealed a normal TI, a cecal 5mm lymph aggregate, asc TA, sigm tix, mild sigm erythema (biopsies benign, negative for colitis) and IH's. The quality of the prep was good. A repeat colonoscopy was advised in 5 years. - GI PCR panel on 5/15/20 was negative.  -  Colonoscopy in 2018, normal as per the patient.

## 2022-02-11 ENCOUNTER — APPOINTMENT (RX ONLY)
Dept: URBAN - METROPOLITAN AREA CLINIC 45 | Facility: CLINIC | Age: 62
Setting detail: DERMATOLOGY
End: 2022-02-11

## 2022-02-11 DIAGNOSIS — L60.3 NAIL DYSTROPHY: ICD-10-CM

## 2022-02-11 PROCEDURE — ? NAIL CLIPPING FOR PAS

## 2022-02-11 PROCEDURE — ? FULL BODY SKIN EXAM - DECLINED

## 2022-02-11 PROCEDURE — ? COUNSELING

## 2022-02-11 PROCEDURE — 99203 OFFICE O/P NEW LOW 30 MIN: CPT

## 2022-02-11 PROCEDURE — ? PRESCRIPTION MEDICATION MANAGEMENT

## 2022-02-11 PROCEDURE — ? PRESCRIPTION

## 2022-02-11 PROCEDURE — ? ADDITIONAL NOTES

## 2022-02-11 RX ORDER — CICLOPIROX 80 MG/ML
SOLUTION TOPICAL
Qty: 6.6 | Refills: 0 | Status: ERX | COMMUNITY
Start: 2022-02-11

## 2022-02-11 RX ORDER — MUPIROCIN 20 MG/G
OINTMENT TOPICAL
Qty: 15 | Refills: 0 | Status: ERX | COMMUNITY
Start: 2022-02-11

## 2022-02-11 RX ADMIN — MUPIROCIN: 20 OINTMENT TOPICAL at 00:00

## 2022-02-11 RX ADMIN — CICLOPIROX: 80 SOLUTION TOPICAL at 00:00

## 2022-02-11 ASSESSMENT — LOCATION DETAILED DESCRIPTION DERM
LOCATION DETAILED: RIGHT MIDDLE FINGERNAIL
LOCATION DETAILED: RIGHT RING FINGERNAIL

## 2022-02-11 ASSESSMENT — LOCATION SIMPLE DESCRIPTION DERM
LOCATION SIMPLE: RIGHT RING FINGERNAIL
LOCATION SIMPLE: RIGHT MIDDLE FINGERNAIL

## 2022-02-11 ASSESSMENT — LOCATION ZONE DERM: LOCATION ZONE: FINGERNAIL

## 2022-02-11 NOTE — PROCEDURE: ADDITIONAL NOTES
Detail Level: Simple
Additional Notes: Patient consent was obtained to proceed with the visit and recommended plan of care after discussion of all risks and benefits, including the risks of COVID-19 exposure.
Additional Notes: PAS done to rule out onychomycosis
Render Risk Assessment In Note?: no

## 2022-02-11 NOTE — PROCEDURE: PRESCRIPTION MEDICATION MANAGEMENT
Plan: Distilled vinegar soaks TIW
Detail Level: Zone
Initiate Treatment: ciclopirox 8 % topical solution \\nQuantity: 6.6 ml  Days Supply: 30\\nSig: Apply to aa nails bid until resolved\\n\\nmupirocin 2 % topical ointment \\nQuantity: 15.0 g  Days Supply: 30\\nSig: Apply to aa of nails bid until resolved
Render In Strict Bullet Format?: No

## 2022-02-11 NOTE — HPI: NAIL DISORDER
Is This A New Presentation, Or A Follow-Up?: Nail Disorder
How Severe Is It?: mild
Additional History: New patient. Pashto speaking

## 2022-03-31 ENCOUNTER — LAB OUTSIDE AN ENCOUNTER (OUTPATIENT)
Dept: URBAN - METROPOLITAN AREA CLINIC 78 | Facility: CLINIC | Age: 62
End: 2022-03-31

## 2022-04-01 LAB — H PYLORI BREATH TEST: NEGATIVE

## 2022-08-05 ENCOUNTER — APPOINTMENT (RX ONLY)
Dept: URBAN - METROPOLITAN AREA CLINIC 45 | Facility: CLINIC | Age: 62
Setting detail: DERMATOLOGY
End: 2022-08-05

## 2022-08-05 DIAGNOSIS — L60.3 NAIL DYSTROPHY: ICD-10-CM | Status: INADEQUATELY CONTROLLED

## 2022-08-05 PROCEDURE — 99214 OFFICE O/P EST MOD 30 MIN: CPT

## 2022-08-05 PROCEDURE — ? COUNSELING

## 2022-08-05 PROCEDURE — ? PRESCRIPTION

## 2022-08-05 PROCEDURE — ? FULL BODY SKIN EXAM - DECLINED

## 2022-08-05 PROCEDURE — ? ADDITIONAL NOTES

## 2022-08-05 PROCEDURE — ? PRESCRIPTION MEDICATION MANAGEMENT

## 2022-08-05 PROCEDURE — ? ORDER TESTS

## 2022-08-05 RX ORDER — CICLOPIROX 80 MG/ML
SOLUTION TOPICAL
Qty: 6.6 | Refills: 0 | Status: ERX

## 2022-08-05 RX ORDER — MUPIROCIN 20 MG/G
OINTMENT TOPICAL
Qty: 15 | Refills: 0 | Status: ERX

## 2022-08-05 ASSESSMENT — LOCATION SIMPLE DESCRIPTION DERM
LOCATION SIMPLE: RIGHT RING FINGERNAIL
LOCATION SIMPLE: RIGHT MIDDLE FINGERNAIL

## 2022-08-05 ASSESSMENT — LOCATION ZONE DERM: LOCATION ZONE: FINGERNAIL

## 2022-08-05 ASSESSMENT — LOCATION DETAILED DESCRIPTION DERM
LOCATION DETAILED: RIGHT MIDDLE FINGERNAIL
LOCATION DETAILED: RIGHT RING FINGERNAIL

## 2022-08-05 NOTE — PROCEDURE: ADDITIONAL NOTES
Detail Level: Simple
Additional Notes: Patient consent was obtained to proceed with the visit and recommended plan of care after discussion of all risks and benefits, including the risks of COVID-19 exposure.
Additional Notes: pt has constant friction and pressure to those specific nails.  discussed this could be the cause of the nail dystrophy.  pt still c/o fungal so adivsed we can do fungal cx w/ stain as PAS was negative.  no paronychia on exam today
Render Risk Assessment In Note?: yes

## 2022-08-05 NOTE — PROCEDURE: ORDER TESTS
Billing Type: Patient Bill
Expected Date Of Service: 08/05/2022
Performing Laboratory: [id number]
Bill For Surgical Tray: no

## 2023-03-15 NOTE — PHYSICAL EXAM GASTROINTESTINAL
Abdomen , soft, nontender, nondistended , no guarding or rigidity , no masses palpable , normal bowel sounds , Liver and Spleen , no hepatomegaly present , no hepatosplenomegaly , liver nontender , spleen not palpable show

## 2023-09-14 ENCOUNTER — TELEPHONE ENCOUNTER (OUTPATIENT)
Dept: URBAN - METROPOLITAN AREA CLINIC 78 | Facility: CLINIC | Age: 63
End: 2023-09-14

## 2023-09-14 RX ORDER — HYOSCYAMINE SULFATE 0.12 MG/1
1 TABLET UNDER THE TONGUE AND ALLOW TO DISSOLVE AS NEEDED TABLET, ORALLY DISINTEGRATING ORAL
Qty: 40 | Refills: 3 | OUTPATIENT
Start: 2023-09-19

## 2023-12-07 ENCOUNTER — OFFICE VISIT (OUTPATIENT)
Dept: URBAN - METROPOLITAN AREA CLINIC 78 | Facility: CLINIC | Age: 63
End: 2023-12-07

## 2024-05-09 ENCOUNTER — OFFICE VISIT (OUTPATIENT)
Dept: URBAN - METROPOLITAN AREA CLINIC 78 | Facility: CLINIC | Age: 64
End: 2024-05-09